# Patient Record
Sex: FEMALE | Race: BLACK OR AFRICAN AMERICAN | Employment: OTHER | URBAN - METROPOLITAN AREA
[De-identification: names, ages, dates, MRNs, and addresses within clinical notes are randomized per-mention and may not be internally consistent; named-entity substitution may affect disease eponyms.]

---

## 2020-09-14 ENCOUNTER — HOSPITAL ENCOUNTER (EMERGENCY)
Age: 85
Discharge: HOME | End: 2020-09-14
Attending: EMERGENCY MEDICINE
Payer: MEDICARE

## 2020-09-14 VITALS
TEMPERATURE: 98.7 F | SYSTOLIC BLOOD PRESSURE: 150 MMHG | RESPIRATION RATE: 16 BRPM | HEART RATE: 72 BPM | OXYGEN SATURATION: 99 % | DIASTOLIC BLOOD PRESSURE: 71 MMHG

## 2020-09-14 PROCEDURE — 99283 EMERGENCY DEPT VISIT LOW MDM: CPT

## 2020-09-14 NOTE — ED NOTES
Bed: 03  Expected date:   Expected time:   Means of arrival:   Comments:  89female walking around outside confused  alertx1, 160/74, 79 18 170BS.       Armando Chan RN  09/14/20 [id number]

## 2020-09-14 NOTE — ED NOTES
Multiple calls to various numbers found on pt's chart for contact info. Voice mail left to [facility] [phone].      Nikki Mckinley RN  09/14/20 [id number]

## 2020-09-14 NOTE — ED TRIAGE NOTES
Pt here via EMS. Bystanders observed pt walking down the road and called EMS. Pt appeared confused on scene. License taken and address located; no one was at the pt's residence. Pt states she lives by herself and takes care of herself. Pt denies any pain; denies any complaints. Pt states she was walking to see her cousin. Pt admits to being confused sometimes. Pt has history of Alzheimers noted on chart.

## 2020-09-14 NOTE — ED PROVIDER NOTES
[address] [facility] ED  EMERGENCY DEPARTMENT ENCOUNTER      Pt Name: Laisha Ibrahim  MRN: [id number]  [place] 5/15/1930  Date of evaluation: 9/14/2020  Provider: Kostas Arredondo,     CHIEF COMPLAINT       Chief Complaint   Patient presents with    Other         HISTORY OF PRESENT ILLNESS   (Location/Symptom, Timing/Onset, Context/Setting, Quality, Duration, Modifying Factors, Severity)  Note limiting factors. Laisha Ibrahim is a 80 y.o. female who presents to the emergency department . Patient was brought in by EMS because she was found wandering. Patient has Alzheimer's dementia. She lives at home with her daughter but nobody was at the home when the police took her to the house. Patient was brought in because they did not know what else to do with her. No signs of trauma. HPI    Nursing Notes were reviewed. REVIEW OF SYSTEMS    (2-9 systems for level 4, 10 or more for level 5)     Review of Systems   Unable to perform ROS: Dementia       Except as noted above the remainder of the review of systems was reviewed and negative. PAST MEDICAL HISTORY   No past medical history on file. SURGICAL HISTORY     No past surgical history on file. CURRENT MEDICATIONS       Previous Medications    No medications on file       ALLERGIES     Patient has no known allergies. FAMILY HISTORY     No family history on file. SOCIAL HISTORY       Social History     Socioeconomic History    Marital status:       Spouse name: Not on file    Number of children: Not on file    Years of education: Not on file    Highest education level: Not on file   Occupational History    Not on file   Social Needs    Financial resource strain: Not on file    Food insecurity     Worry: Not on file     Inability: Not on file    Transportation needs     Medical: Not on file     Non-medical: Not on file   Tobacco Use    Smoking status: Not on file   Substance and Sexual Activity    Alcohol use: Not on specified. DISCHARGE MEDICATIONS:  New Prescriptions    No medications on file     Controlled Substances Monitoring:     No flowsheet data found.     (Please note that portions of this note were completed with a voice recognition program.  Efforts were made to edit the dictations but occasionally words are mis-transcribed.)    Radha Louis DO (electronically signed)  Attending Emergency Physician            Radha Louis DO  09/14/20 [id number]

## 2020-09-14 NOTE — ED NOTES
Daughter, Mildred Gonzáles, here to ER after receiving call from [facility].        Ese Metz, REGAN  09/14/20 [id number]